# Patient Record
(demographics unavailable — no encounter records)

---

## 2024-12-03 NOTE — ASSESSMENT
[FreeTextEntry1] : Doing well regarding sleeve, wegovy, and her Desmoid resection at this time. Check labs, keep at same dose wegovy for now, 3 month f/u. 35 minute evaluation. WW

## 2024-12-03 NOTE — PHYSICAL EXAM
[Normal] : affect appropriate [de-identified] : no mass [de-identified] : well healed midline scar without hernia

## 2024-12-03 NOTE — HISTORY OF PRESENT ILLNESS
[de-identified] : 18 months s/p sleeve with no issues or concerns regarding that. No GERD, nausea, or vomiting. She is also now on wegovy for about 4 months and has lost an additional 20lbs, she is now down about 70lbs total which is about 78% EWL since her sleeve. She is 20lbs away from normal weight. She has an excellent effect with 1.0 mg weekly with no side effects and I see no need to escalate at this time. Taking vitamins as recommended but she is due for a check of levels. [de-identified] : Her Desmoid tumor is being watch by Oncology, CT scan later this month, no issues with that as of now.

## 2025-01-29 NOTE — BEGINNING OF VISIT
[0] : 2) Feeling down, depressed, or hopeless: Not at all (0) [PHQ-2 Negative] : PHQ-2 Negative [[id number]] : 0 [Pain Scale: ___] : On a scale of 1-10, today the patient's pain is a(n) [unfilled]. [Never] : Never [Date Discussed (MM/DD/YY): ___] : Discussed: [unfilled] [Patient/Caregiver not ready to engage] : Patient/Caregiver not ready to engage

## 2025-01-29 NOTE — BEGINNING OF VISIT
[0] : 2) Feeling down, depressed, or hopeless: Not at all (0) [PHQ-2 Negative] : PHQ-2 Negative [[id number]] : 0 [Pain Scale: ___] : On a scale of 1-10, today the patient's pain is a(n) [unfilled]. [Never] : Never [Date Discussed (MM/DD/YY): ___] : Discussed: [unfilled] [Patient/Caregiver not ready to engage] : Patient/Caregiver not ready to engage No

## 2025-01-29 NOTE — BEGINNING OF VISIT
[0] : 2) Feeling down, depressed, or hopeless: Not at all (0) [PHQ-2 Negative] : PHQ-2 Negative [QIQ6Appjf] : 0 [Pain Scale: ___] : On a scale of 1-10, today the patient's pain is a(n) [unfilled]. [Never] : Never [Date Discussed (MM/DD/YY): ___] : Discussed: [unfilled] [Patient/Caregiver not ready to engage] : Patient/Caregiver not ready to engage

## 2025-01-29 NOTE — BEGINNING OF VISIT
[0] : 2) Feeling down, depressed, or hopeless: Not at all (0) [PHQ-2 Negative] : PHQ-2 Negative [[id number]] : 0 [Pain Scale: ___] : On a scale of 1-10, today the patient's pain is a(n) [unfilled]. [Never] : Never [Date Discussed (MM/DD/YY): ___] : Discussed: [unfilled] [Patient/Caregiver not ready to engage] : Patient/Caregiver not ready to engage

## 2025-02-03 NOTE — HISTORY OF PRESENT ILLNESS
[0 - No Distress] : Distress Level: 0 [ECOG Performance Status: 0 - Fully active, able to carry on all pre-disease performance without restriction] : Performance Status: 0 - Fully active, able to carry on all pre-disease performance without restriction [de-identified] : 56 y/o female with hx of gastric sleeve surgery in 5/2023 presented in 12/2023 with small bowel obstruction due to a desmoid tumor. Now s/p small bowel resection on 12/18/23. Here for oncologic evaluation.   Patient overall feeling well. Denies any abdominal pain or bloating. Has noted that she has chronic dry eyes and dry mouth which has been concerning her. Also has noted intermittent joint pain in her hands.   PATHOLOGY: Pathology demonstrates desmoid tumor and has focally microscopic disease at the mesenteric margin it was invasive into some of the small bowel from the outside Fibromatosis- desmoid type. Nodular mass 14 cm in greatest dimension adhered to the small bowel with areas where the lesion is seen invading muscularis propria of the small bowel. Small bowel margins are negative for involvement.  Family History: Mother: AML in 2012- remission Maternal grandmother: Breast and uterine cancer Maternal Aunt: Rheumatoid Arthritis  Health maintenance Pap smear: 2023- normal Mammogram - 4/2024- negative PCP- due now Colonoscopy- 10/2023- small polyps- 5 years  [de-identified] : here for follow up. Has been feeling well.  US guided biopsy performed at [facility] on 01/21 on left breast and atypical hyperplasia and will have removed  CT scan performed on 12/09/24, results reviewed- RIGO

## 2025-02-03 NOTE — REVIEW OF SYSTEMS
[Fatigue] : fatigue [Joint Stiffness] : joint stiffness [Dry Eyes] : dryness of the eyes [Hot Flashes] : hot flashes [Negative] : Constitutional [FreeTextEntry3] : constant dry eyes for the last 2 years [FreeTextEntry9] : left hand joint stiffness [de-identified] : menopausal

## 2025-02-03 NOTE — HISTORY OF PRESENT ILLNESS
[0 - No Distress] : Distress Level: 0 [ECOG Performance Status: 0 - Fully active, able to carry on all pre-disease performance without restriction] : Performance Status: 0 - Fully active, able to carry on all pre-disease performance without restriction [de-identified] : 56 y/o female with hx of gastric sleeve surgery in 5/2023 presented in 12/2023 with small bowel obstruction due to a desmoid tumor. Now s/p small bowel resection on 12/18/23. Here for oncologic evaluation.   Patient overall feeling well. Denies any abdominal pain or bloating. Has noted that she has chronic dry eyes and dry mouth which has been concerning her. Also has noted intermittent joint pain in her hands.   PATHOLOGY: Pathology demonstrates desmoid tumor and has focally microscopic disease at the mesenteric margin it was invasive into some of the small bowel from the outside Fibromatosis- desmoid type. Nodular mass 14 cm in greatest dimension adhered to the small bowel with areas where the lesion is seen invading muscularis propria of the small bowel. Small bowel margins are negative for involvement.  Family History: Mother: AML in 2012- remission Maternal grandmother: Breast and uterine cancer Maternal Aunt: Rheumatoid Arthritis  Health maintenance Pap smear: 2023- normal Mammogram - 4/2024- negative PCP- due now Colonoscopy- 10/2023- small polyps- 5 years  [de-identified] : here for follow up. Has been feeling well.  US guided biopsy performed at [facility] on 01/21 on left breast and atypical hyperplasia and will have removed  CT scan performed on 12/09/24, results reviewed- RIGO

## 2025-02-03 NOTE — REVIEW OF SYSTEMS
[Fatigue] : fatigue [Joint Stiffness] : joint stiffness [Dry Eyes] : dryness of the eyes [Hot Flashes] : hot flashes [Negative] : Constitutional [FreeTextEntry3] : constant dry eyes for the last 2 years [FreeTextEntry9] : left hand joint stiffness [de-identified] : menopausal

## 2025-02-03 NOTE — REVIEW OF SYSTEMS
[Fatigue] : fatigue [Dry Eyes] : dryness of the eyes [Joint Stiffness] : joint stiffness [Hot Flashes] : hot flashes [Negative] : Constitutional [FreeTextEntry3] : constant dry eyes for the last 2 years [FreeTextEntry9] : left hand joint stiffness [de-identified] : menopausal

## 2025-02-03 NOTE — REVIEW OF SYSTEMS
[Fatigue] : fatigue [Dry Eyes] : dryness of the eyes [Joint Stiffness] : joint stiffness [Hot Flashes] : hot flashes [Negative] : Constitutional [FreeTextEntry3] : constant dry eyes for the last 2 years [FreeTextEntry9] : left hand joint stiffness [de-identified] : menopausal

## 2025-02-03 NOTE — ASSESSMENT
[FreeTextEntry1] : #Breast nodule seen on CT Mammogram 2025 with hx now shows ADH pending breast surgery eval and possible lumpectomy continue to follow  #Sicca syndrome -followed by Rheum -Xray hands ? osteoarthritis -on pilocarpine  #Desmoid tumor- intraabdominal Patient's diagnosis was discussed in detail. Overall, desmoid tumors are benign, slowly growing fibroblastic neoplasms with no metastatic potential but a propensity for local recurrence, even after complete surgical resection. Even patients who undergo aggressive resection with widely negative margins have recurrence rates of 16 to 39 percent.   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  12/2024- RIGO Continue surveillance: PE and radiographic studies every 3-6 months for the first 3 years followed by yearly up to year 6, and then biannually.   >CBC, CMP >f/u with breast surgery for ADH. Discussed chemoprevention following surgery >f/u CT C/A/P for surveillance next due June 2025. Will continue every 6 months X 3 years.  >follow up with rheumatology for chronic dry eyes, dry mouth and joint pains   Health maintenance Pap smear:2023- normal Mammogram - 4/2024- negative PCP- due now Colonoscopy- 10/2023- small polyps- 5 years

## 2025-02-03 NOTE — PHYSICAL EXAM
[Fully active, able to carry on all pre-disease performance without restriction] : Status 0 - Fully active, able to carry on all pre-disease performance without restriction [Normal] : normal appearance, no rash, nodules, vesicles, ulcers, erythema [de-identified] : well healed midline scar

## 2025-02-03 NOTE — PHYSICAL EXAM
[Fully active, able to carry on all pre-disease performance without restriction] : Status 0 - Fully active, able to carry on all pre-disease performance without restriction [Normal] : normal appearance, no rash, nodules, vesicles, ulcers, erythema [de-identified] : well healed midline scar

## 2025-02-03 NOTE — HISTORY OF PRESENT ILLNESS
[0 - No Distress] : Distress Level: 0 [ECOG Performance Status: 0 - Fully active, able to carry on all pre-disease performance without restriction] : Performance Status: 0 - Fully active, able to carry on all pre-disease performance without restriction [de-identified] : 54 y/o female with hx of gastric sleeve surgery in 5/2023 presented in 12/2023 with small bowel obstruction due to a desmoid tumor. Now s/p small bowel resection on 12/18/23. Here for oncologic evaluation.   Patient overall feeling well. Denies any abdominal pain or bloating. Has noted that she has chronic dry eyes and dry mouth which has been concerning her. Also has noted intermittent joint pain in her hands.   PATHOLOGY: Pathology demonstrates desmoid tumor and has focally microscopic disease at the mesenteric margin it was invasive into some of the small bowel from the outside Fibromatosis- desmoid type. Nodular mass 14 cm in greatest dimension adhered to the small bowel with areas where the lesion is seen invading muscularis propria of the small bowel. Small bowel margins are negative for involvement.  Family History: Mother: AML in 2012- remission Maternal grandmother: Breast and uterine cancer Maternal Aunt: Rheumatoid Arthritis  Health maintenance Pap smear: 2023- normal Mammogram - 4/2024- negative PCP- due now Colonoscopy- 10/2023- small polyps- 5 years  [de-identified] : here for follow up. Has been feeling well.  US guided biopsy performed at [facility] on 01/21 on left breast and atypical hyperplasia and will have removed  CT scan performed on 12/09/24, results reviewed- RIGO

## 2025-02-03 NOTE — HISTORY OF PRESENT ILLNESS
[0 - No Distress] : Distress Level: 0 [ECOG Performance Status: 0 - Fully active, able to carry on all pre-disease performance without restriction] : Performance Status: 0 - Fully active, able to carry on all pre-disease performance without restriction [de-identified] : 54 y/o female with hx of gastric sleeve surgery in 5/2023 presented in 12/2023 with small bowel obstruction due to a desmoid tumor. Now s/p small bowel resection on 12/18/23. Here for oncologic evaluation.   Patient overall feeling well. Denies any abdominal pain or bloating. Has noted that she has chronic dry eyes and dry mouth which has been concerning her. Also has noted intermittent joint pain in her hands.   PATHOLOGY: Pathology demonstrates desmoid tumor and has focally microscopic disease at the mesenteric margin it was invasive into some of the small bowel from the outside Fibromatosis- desmoid type. Nodular mass 14 cm in greatest dimension adhered to the small bowel with areas where the lesion is seen invading muscularis propria of the small bowel. Small bowel margins are negative for involvement.  Family History: Mother: AML in 2012- remission Maternal grandmother: Breast and uterine cancer Maternal Aunt: Rheumatoid Arthritis  Health maintenance Pap smear: 2023- normal Mammogram - 4/2024- negative PCP- due now Colonoscopy- 10/2023- small polyps- 5 years  [de-identified] : here for follow up. Has been feeling well.  US guided biopsy performed at [facility] on 01/21 on left breast and atypical hyperplasia and will have removed  CT scan performed on 12/09/24, results reviewed- RIGO

## 2025-02-03 NOTE — PHYSICAL EXAM
[Fully active, able to carry on all pre-disease performance without restriction] : Status 0 - Fully active, able to carry on all pre-disease performance without restriction [Normal] : normal appearance, no rash, nodules, vesicles, ulcers, erythema [de-identified] : well healed midline scar

## 2025-02-03 NOTE — PHYSICAL EXAM
[Fully active, able to carry on all pre-disease performance without restriction] : Status 0 - Fully active, able to carry on all pre-disease performance without restriction [Normal] : normal appearance, no rash, nodules, vesicles, ulcers, erythema [de-identified] : well healed midline scar

## 2025-03-20 NOTE — ASSESSMENT
Stop Prilosec    Imodium ad 2 with the first loose bowel movement then one with each loose bowel movement thereafter.    Continue brat diet for one day then eat regular food but no dairy products.    Drink lots of Pedialyte and chicken soup.   [FreeTextEntry1] : Sleeve is fine, no issues. Wegovy tolerated well and would like escalation to 1.7 mg. TH f/u 3 months. approaching 90% EWL. 30 min evaluation WW

## 2025-03-20 NOTE — HISTORY OF PRESENT ILLNESS
[de-identified] : 2 years out and now supplementing weight loss with Wegovy. Finishing 1.o mg about to start 1.7mg. Is at weight osvaldo. BMI 26.4. Is at goal but patient would like 5 more lbs. No issues or concerns with wegovy at this time. Recent breast excision for atypical lobular hyperplasia, will likely just observe. No sleeve issues. No issues with abdominal desmoid at present and gets oncology f/u. [de-identified] : Doing well regarding weight, sleeve, and abdominal mass excision.

## 2025-03-20 NOTE — PHYSICAL EXAM
[Dysphagia] : no dysphagia [Normal] : affect appropriate [de-identified] : no visible mass [de-identified] : no obvious sob [de-identified] : face

## 2025-03-28 NOTE — HISTORY OF PRESENT ILLNESS
[0 - No Distress] : Distress Level: 0 [ECOG Performance Status: 0 - Fully active, able to carry on all pre-disease performance without restriction] : Performance Status: 0 - Fully active, able to carry on all pre-disease performance without restriction [de-identified] : 54 y/o female with hx of gastric sleeve surgery in 5/2023 presented in 12/2023 with small bowel obstruction due to a desmoid tumor. Now s/p small bowel resection on 12/18/23. Here for oncologic evaluation.   Patient overall feeling well. Denies any abdominal pain or bloating. Has noted that she has chronic dry eyes and dry mouth which has been concerning her. Also has noted intermittent joint pain in her hands.   PATHOLOGY: Pathology demonstrates desmoid tumor and has focally microscopic disease at the mesenteric margin it was invasive into some of the small bowel from the outside Fibromatosis- desmoid type. Nodular mass 14 cm in greatest dimension adhered to the small bowel with areas where the lesion is seen invading muscularis propria of the small bowel. Small bowel margins are negative for involvement.  Family History: Mother: AML in 2012- remission Maternal grandmother: Breast and uterine cancer Maternal Aunt: Rheumatoid Arthritis  Health maintenance Pap smear: 2023- normal Mammogram - 4/2024- negative PCP- due now Colonoscopy- 10/2023- small polyps- 5 years  [de-identified] : here for follow up. Has been feeling well.  US guided biopsy performed at [facility] on 01/21 on left breast and atypical hyperplasia and is s/p lumpectomy. CT scan performed on 12/09/24, results reviewed- RIGO

## 2025-03-28 NOTE — PHYSICAL EXAM
[Fully active, able to carry on all pre-disease performance without restriction] : Status 0 - Fully active, able to carry on all pre-disease performance without restriction [Normal] : normal appearance, no rash, nodules, vesicles, ulcers, erythema [de-identified] : well healed scar left breast inferior to nipple [de-identified] : well healed midline scar

## 2025-03-28 NOTE — REVIEW OF SYSTEMS
[Dry Eyes] : dryness of the eyes [Joint Stiffness] : joint stiffness [Hot Flashes] : hot flashes [Negative] : Heme/Lymph [FreeTextEntry3] : constant dry eyes for the last 2 years [FreeTextEntry9] : left hand joint stiffness [de-identified] : menopausal

## 2025-03-28 NOTE — PHYSICAL EXAM
[Fully active, able to carry on all pre-disease performance without restriction] : Status 0 - Fully active, able to carry on all pre-disease performance without restriction [Normal] : normal appearance, no rash, nodules, vesicles, ulcers, erythema [de-identified] : well healed scar left breast inferior to nipple [de-identified] : well healed midline scar

## 2025-03-28 NOTE — REVIEW OF SYSTEMS
[Dry Eyes] : dryness of the eyes [Joint Stiffness] : joint stiffness [Hot Flashes] : hot flashes [Negative] : Heme/Lymph [FreeTextEntry3] : constant dry eyes for the last 2 years [FreeTextEntry9] : left hand joint stiffness [de-identified] : menopausal

## 2025-03-28 NOTE — REVIEW OF SYSTEMS
[Dry Eyes] : dryness of the eyes [Joint Stiffness] : joint stiffness [Hot Flashes] : hot flashes [Negative] : Heme/Lymph [FreeTextEntry3] : constant dry eyes for the last 2 years [FreeTextEntry9] : left hand joint stiffness [de-identified] : menopausal

## 2025-03-28 NOTE — HISTORY OF PRESENT ILLNESS
[0 - No Distress] : Distress Level: 0 [ECOG Performance Status: 0 - Fully active, able to carry on all pre-disease performance without restriction] : Performance Status: 0 - Fully active, able to carry on all pre-disease performance without restriction [de-identified] : 56 y/o female with hx of gastric sleeve surgery in 5/2023 presented in 12/2023 with small bowel obstruction due to a desmoid tumor. Now s/p small bowel resection on 12/18/23. Here for oncologic evaluation.   Patient overall feeling well. Denies any abdominal pain or bloating. Has noted that she has chronic dry eyes and dry mouth which has been concerning her. Also has noted intermittent joint pain in her hands.   PATHOLOGY: Pathology demonstrates desmoid tumor and has focally microscopic disease at the mesenteric margin it was invasive into some of the small bowel from the outside Fibromatosis- desmoid type. Nodular mass 14 cm in greatest dimension adhered to the small bowel with areas where the lesion is seen invading muscularis propria of the small bowel. Small bowel margins are negative for involvement.  Family History: Mother: AML in 2012- remission Maternal grandmother: Breast and uterine cancer Maternal Aunt: Rheumatoid Arthritis  Health maintenance Pap smear: 2023- normal Mammogram - 4/2024- negative PCP- due now Colonoscopy- 10/2023- small polyps- 5 years  [de-identified] : here for follow up. Has been feeling well.  US guided biopsy performed at [facility] on 01/21 on left breast and atypical hyperplasia and is s/p lumpectomy. CT scan performed on 12/09/24, results reviewed- RIGO

## 2025-03-28 NOTE — PHYSICAL EXAM
[Fully active, able to carry on all pre-disease performance without restriction] : Status 0 - Fully active, able to carry on all pre-disease performance without restriction [Normal] : normal appearance, no rash, nodules, vesicles, ulcers, erythema [de-identified] : well healed scar left breast inferior to nipple [de-identified] : well healed midline scar

## 2025-03-28 NOTE — ASSESSMENT
[FreeTextEntry1] : #Breast nodule seen on CT Mammogram 2025 with hx now shows ADH s/p lumpectomy We reviewed the diagnosis, prognosis, and natural history of ADH L Puts her at increased risk of developing breast cancer in the future more so than the general population. I would recommend chemoprevention to decrease her risk of developing breast cancer despite no difference in breast cancer- specific or all-cause mortality. We reviewed HURTADO-01 study which shows low dose tamoxifen X 3 years decreases incidence of developing breast cancer AE for tamoxifen including mood swings, low libido, hair thinning, DVT, uterine cancer were discussed.    #Sicca syndrome -followed by Rheum -Xray hands ? osteoarthritis -on pilocarpine  #Desmoid tumor- intraabdominal Patient's diagnosis was discussed in detail. Overall, desmoid tumors are benign, slowly growing fibroblastic neoplasms with no metastatic potential but a propensity for local recurrence, even after complete surgical resection. Even patients who undergo aggressive resection with widely negative margins have recurrence rates of 16 to 39 percent.   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  12/2024- RIGO Continue surveillance: PE and radiographic studies every 3-6 months for the first 3 years followed by yearly up to year 6, and then biannually.   PLan: >CBC, CMP >start tamoxifen 5 mg daily. Will continue X 3 years for ADH >f/u CT C/A/P for surveillance next due June 2025 ( ordered). Will continue every 6 months X 3 years.  >follow up with rheumatology for chronic dry eyes, dry mouth and joint pains  Televisit in 1 month for toxicity check   Health maintenance Pap smear:2023- normal Mammogram - 4/2024- negative PCP- due now Colonoscopy- 10/2023- small polyps- 5 years

## 2025-03-28 NOTE — HISTORY OF PRESENT ILLNESS
[0 - No Distress] : Distress Level: 0 [ECOG Performance Status: 0 - Fully active, able to carry on all pre-disease performance without restriction] : Performance Status: 0 - Fully active, able to carry on all pre-disease performance without restriction [de-identified] : 54 y/o female with hx of gastric sleeve surgery in 5/2023 presented in 12/2023 with small bowel obstruction due to a desmoid tumor. Now s/p small bowel resection on 12/18/23. Here for oncologic evaluation.   Patient overall feeling well. Denies any abdominal pain or bloating. Has noted that she has chronic dry eyes and dry mouth which has been concerning her. Also has noted intermittent joint pain in her hands.   PATHOLOGY: Pathology demonstrates desmoid tumor and has focally microscopic disease at the mesenteric margin it was invasive into some of the small bowel from the outside Fibromatosis- desmoid type. Nodular mass 14 cm in greatest dimension adhered to the small bowel with areas where the lesion is seen invading muscularis propria of the small bowel. Small bowel margins are negative for involvement.  Family History: Mother: AML in 2012- remission Maternal grandmother: Breast and uterine cancer Maternal Aunt: Rheumatoid Arthritis  Health maintenance Pap smear: 2023- normal Mammogram - 4/2024- negative PCP- due now Colonoscopy- 10/2023- small polyps- 5 years  [de-identified] : here for follow up. Has been feeling well.  US guided biopsy performed at [facility] on 01/21 on left breast and atypical hyperplasia and is s/p lumpectomy. CT scan performed on 12/09/24, results reviewed- RIGO

## 2025-04-21 NOTE — REVIEW OF SYSTEMS
[Dry Eyes] : dryness of the eyes [Joint Stiffness] : joint stiffness [Hot Flashes] : hot flashes [Negative] : Heme/Lymph [FreeTextEntry3] : constant dry eyes for the last 2 years [FreeTextEntry9] : left hand joint stiffness [de-identified] : menopausal

## 2025-04-21 NOTE — ASSESSMENT
[FreeTextEntry1] : #Breast nodule -seen on CT -Mammogram 2025 with hx now shows ADH -US guided biopsy performed at [facility] on 01/21 on left breast and atypical hyperplasia and is s/p lumpectomy. -We reviewed the diagnosis, prognosis, and natural history of ADH L -Puts her at increased risk of developing breast cancer in the future more so than the general population. -I would recommend chemoprevention to decrease her risk of developing breast cancer despite no difference in breast cancer- specific or all-cause mortality. -We reviewed HURTADO-01 study which shows low dose tamoxifen X 3 years decreases incidence of developing breast cancer -AE for tamoxifen including mood swings, low libido, hair thinning, DVT, uterine cancer were discussed. -started but only lasted few days due to joint aches and chest discomfort   #Sicca syndrome -followed by Rheum -Xray hands ? osteoarthritis -on pilocarpine  #Desmoid tumor- intraabdominal -Patient's diagnosis was discussed in detail. Overall, desmoid tumors are benign, slowly growing fibroblastic neoplasms with no metastatic potential but a propensity for local recurrence, even after complete surgical resection. -Even patients who undergo aggressive resection with widely negative margins have recurrence rates of 16 to 39 percent.  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  -12/2024- RIGO -Continue surveillance: PE and radiographic studies every 3-6 months for the first 3 years followed by yearly up to year 6, and then biannually.   PLan: >CBC, CMP >patient wants to re-attempt tamoxifen 5 mg daily. Discussed that if symptoms of chest discomfort returns to stop medication and we would continue surveillance >f/u CT C/A/P for surveillance next due June 2025 (ordered). Will continue every 6 months X 3 years.  >follow up with rheumatology for chronic dry eyes, dry mouth and joint pains   Health maintenance Pap smear:2023- normal Mammogram - 4/2024- negative PCP- due now Colonoscopy- 10/2023- small polyps- 5 years

## 2025-04-21 NOTE — ASSESSMENT
[FreeTextEntry1] : #Breast nodule -seen on CT -Mammogram 2025 with hx now shows ADH -US guided biopsy performed at [facility] on 01/21 on left breast and atypical hyperplasia and is s/p lumpectomy. -We reviewed the diagnosis, prognosis, and natural history of ADH L -Puts her at increased risk of developing breast cancer in the future more so than the general population. -I would recommend chemoprevention to decrease her risk of developing breast cancer despite no difference in breast cancer- specific or all-cause mortality. -We reviewed [id number]-01 study which shows low dose tamoxifen X 3 years decreases incidence of developing breast cancer -AE for tamoxifen including mood swings, low libido, hair thinning, DVT, uterine cancer were discussed. -started but only lasted few days due to joint aches and chest discomfort   #Sicca syndrome -followed by Rheum -Xray hands ? osteoarthritis -on pilocarpine  #Desmoid tumor- intraabdominal -Patient's diagnosis was discussed in detail. Overall, desmoid tumors are benign, slowly growing fibroblastic neoplasms with no metastatic potential but a propensity for local recurrence, even after complete surgical resection. -Even patients who undergo aggressive resection with widely negative margins have recurrence rates of 16 to 39 percent.  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  -12/2024- RIGO -Continue surveillance: PE and radiographic studies every 3-6 months for the first 3 years followed by yearly up to year 6, and then biannually.   PLan: >CBC, CMP >patient wants to re-attempt tamoxifen 5 mg daily. Discussed that if symptoms of chest discomfort returns to stop medication and we would continue surveillance >f/u CT C/A/P for surveillance next due June 2025 (ordered). Will continue every 6 months X 3 years.  >follow up with rheumatology for chronic dry eyes, dry mouth and joint pains   Health maintenance Pap smear:2023- normal Mammogram - 4/2024- negative PCP- due now Colonoscopy- 10/2023- small polyps- 5 years

## 2025-04-21 NOTE — HISTORY OF PRESENT ILLNESS
[0 - No Distress] : Distress Level: 0 [ECOG Performance Status: 0 - Fully active, able to carry on all pre-disease performance without restriction] : Performance Status: 0 - Fully active, able to carry on all pre-disease performance without restriction [de-identified] : 54 y/o female with hx of gastric sleeve surgery in 5/2023 presented in 12/2023 with small bowel obstruction due to a desmoid tumor. Now s/p small bowel resection on 12/18/23. Here for oncologic evaluation.   Patient overall feeling well. Denies any abdominal pain or bloating. Has noted that she has chronic dry eyes and dry mouth which has been concerning her. Also has noted intermittent joint pain in her hands.   PATHOLOGY: Pathology demonstrates desmoid tumor and has focally microscopic disease at the mesenteric margin it was invasive into some of the small bowel from the outside Fibromatosis- desmoid type. Nodular mass 14 cm in greatest dimension adhered to the small bowel with areas where the lesion is seen invading muscularis propria of the small bowel. Small bowel margins are negative for involvement.  Family History: Mother: AML in 2012- remission Maternal grandmother: Breast and uterine cancer Maternal Aunt: Rheumatoid Arthritis  Health maintenance Pap smear: 2023- normal Mammogram - 4/2024- negative PCP- due now Colonoscopy- 10/2023- small polyps- 5 years  [de-identified] : Verbal consent given on 04/18/2025 at 16:02 by ACE HINSON, ~  ~.here for follow up.  Started the low dose tamoxifen but stopped after a few days due to AE. Felt joint aches and had episodes of tachycardia/chest discomfort. Plans to do another trial but we discussed that if it occurs again, for her to stop the medication

## 2025-04-21 NOTE — HISTORY OF PRESENT ILLNESS
[0 - No Distress] : Distress Level: 0 [ECOG Performance Status: 0 - Fully active, able to carry on all pre-disease performance without restriction] : Performance Status: 0 - Fully active, able to carry on all pre-disease performance without restriction [de-identified] : 56 y/o female with hx of gastric sleeve surgery in 5/2023 presented in 12/2023 with small bowel obstruction due to a desmoid tumor. Now s/p small bowel resection on 12/18/23. Here for oncologic evaluation.   Patient overall feeling well. Denies any abdominal pain or bloating. Has noted that she has chronic dry eyes and dry mouth which has been concerning her. Also has noted intermittent joint pain in her hands.   PATHOLOGY: Pathology demonstrates desmoid tumor and has focally microscopic disease at the mesenteric margin it was invasive into some of the small bowel from the outside Fibromatosis- desmoid type. Nodular mass 14 cm in greatest dimension adhered to the small bowel with areas where the lesion is seen invading muscularis propria of the small bowel. Small bowel margins are negative for involvement.  Family History: Mother: AML in 2012- remission Maternal grandmother: Breast and uterine cancer Maternal Aunt: Rheumatoid Arthritis  Health maintenance Pap smear: 2023- normal Mammogram - 4/2024- negative PCP- due now Colonoscopy- 10/2023- small polyps- 5 years  [de-identified] : Verbal consent given on 04/18/2025 at 16:02 by ACE HINSON, ~  ~.here for follow up.  Started the low dose tamoxifen but stopped after a few days due to AE. Felt joint aches and had episodes of tachycardia/chest discomfort. Plans to do another trial but we discussed that if it occurs again, for her to stop the medication

## 2025-04-21 NOTE — PHYSICAL EXAM
[Fully active, able to carry on all pre-disease performance without restriction] : Status 0 - Fully active, able to carry on all pre-disease performance without restriction [Normal] : normal appearance, no rash, nodules, vesicles, ulcers, erythema [de-identified] : well healed scar left breast inferior to nipple [de-identified] : well healed midline scar

## 2025-04-21 NOTE — REVIEW OF SYSTEMS
[Dry Eyes] : dryness of the eyes [Joint Stiffness] : joint stiffness [Hot Flashes] : hot flashes [Negative] : Heme/Lymph [FreeTextEntry3] : constant dry eyes for the last 2 years [FreeTextEntry9] : left hand joint stiffness [de-identified] : menopausal

## 2025-04-21 NOTE — PHYSICAL EXAM
[Fully active, able to carry on all pre-disease performance without restriction] : Status 0 - Fully active, able to carry on all pre-disease performance without restriction [Normal] : normal appearance, no rash, nodules, vesicles, ulcers, erythema [de-identified] : well healed scar left breast inferior to nipple [de-identified] : well healed midline scar